# Patient Record
Sex: FEMALE | Race: BLACK OR AFRICAN AMERICAN | NOT HISPANIC OR LATINO | URBAN - METROPOLITAN AREA
[De-identification: names, ages, dates, MRNs, and addresses within clinical notes are randomized per-mention and may not be internally consistent; named-entity substitution may affect disease eponyms.]

---

## 2017-03-03 ENCOUNTER — EMERGENCY (EMERGENCY)
Facility: HOSPITAL | Age: 27
LOS: 1 days | Discharge: HOME | End: 2017-03-03
Attending: EMERGENCY MEDICINE | Admitting: EMERGENCY MEDICINE
Payer: MEDICAID

## 2017-03-03 VITALS
DIASTOLIC BLOOD PRESSURE: 84 MMHG | SYSTOLIC BLOOD PRESSURE: 120 MMHG | HEART RATE: 91 BPM | OXYGEN SATURATION: 99 % | TEMPERATURE: 98 F | RESPIRATION RATE: 20 BRPM

## 2017-03-03 VITALS
OXYGEN SATURATION: 99 % | DIASTOLIC BLOOD PRESSURE: 72 MMHG | HEART RATE: 74 BPM | RESPIRATION RATE: 20 BRPM | SYSTOLIC BLOOD PRESSURE: 108 MMHG

## 2017-03-03 DIAGNOSIS — Z90.49 ACQUIRED ABSENCE OF OTHER SPECIFIED PARTS OF DIGESTIVE TRACT: Chronic | ICD-10-CM

## 2017-03-03 DIAGNOSIS — Z90.89 ACQUIRED ABSENCE OF OTHER ORGANS: ICD-10-CM

## 2017-03-03 DIAGNOSIS — R51 HEADACHE: ICD-10-CM

## 2017-03-03 DIAGNOSIS — G43.909 MIGRAINE, UNSPECIFIED, NOT INTRACTABLE, WITHOUT STATUS MIGRAINOSUS: ICD-10-CM

## 2017-03-03 LAB
ALBUMIN SERPL ELPH-MCNC: 3.9 G/DL — SIGNIFICANT CHANGE UP (ref 3.3–5)
ALP SERPL-CCNC: 72 U/L — SIGNIFICANT CHANGE UP (ref 40–120)
ALT FLD-CCNC: 21 U/L RC — SIGNIFICANT CHANGE UP (ref 10–45)
ANION GAP SERPL CALC-SCNC: 11 MMOL/L — SIGNIFICANT CHANGE UP (ref 5–17)
APPEARANCE UR: CLEAR — SIGNIFICANT CHANGE UP
AST SERPL-CCNC: 22 U/L — SIGNIFICANT CHANGE UP (ref 10–40)
BASOPHILS # BLD AUTO: 0 K/UL — SIGNIFICANT CHANGE UP (ref 0–0.2)
BASOPHILS NFR BLD AUTO: 0.7 % — SIGNIFICANT CHANGE UP (ref 0–2)
BILIRUB SERPL-MCNC: 0.2 MG/DL — SIGNIFICANT CHANGE UP (ref 0.2–1.2)
BILIRUB UR-MCNC: NEGATIVE — SIGNIFICANT CHANGE UP
BUN SERPL-MCNC: 9 MG/DL — SIGNIFICANT CHANGE UP (ref 7–23)
CALCIUM SERPL-MCNC: 9.2 MG/DL — SIGNIFICANT CHANGE UP (ref 8.4–10.5)
CHLORIDE SERPL-SCNC: 104 MMOL/L — SIGNIFICANT CHANGE UP (ref 96–108)
CO2 SERPL-SCNC: 26 MMOL/L — SIGNIFICANT CHANGE UP (ref 22–31)
COLOR SPEC: SIGNIFICANT CHANGE UP
CREAT SERPL-MCNC: 0.86 MG/DL — SIGNIFICANT CHANGE UP (ref 0.5–1.3)
DIFF PNL FLD: NEGATIVE — SIGNIFICANT CHANGE UP
EOSINOPHIL # BLD AUTO: 0.1 K/UL — SIGNIFICANT CHANGE UP (ref 0–0.5)
EOSINOPHIL NFR BLD AUTO: 2 % — SIGNIFICANT CHANGE UP (ref 0–6)
GLUCOSE SERPL-MCNC: 168 MG/DL — HIGH (ref 70–99)
GLUCOSE UR QL: NEGATIVE — SIGNIFICANT CHANGE UP
HCT VFR BLD CALC: 33 % — LOW (ref 34.5–45)
HGB BLD-MCNC: 10.8 G/DL — LOW (ref 11.5–15.5)
KETONES UR-MCNC: NEGATIVE — SIGNIFICANT CHANGE UP
LEUKOCYTE ESTERASE UR-ACNC: NEGATIVE — SIGNIFICANT CHANGE UP
LYMPHOCYTES # BLD AUTO: 1.7 K/UL — SIGNIFICANT CHANGE UP (ref 1–3.3)
LYMPHOCYTES # BLD AUTO: 35.6 % — SIGNIFICANT CHANGE UP (ref 13–44)
MCHC RBC-ENTMCNC: 25.8 PG — LOW (ref 27–34)
MCHC RBC-ENTMCNC: 32.7 GM/DL — SIGNIFICANT CHANGE UP (ref 32–36)
MCV RBC AUTO: 78.8 FL — LOW (ref 80–100)
MONOCYTES # BLD AUTO: 0.2 K/UL — SIGNIFICANT CHANGE UP (ref 0–0.9)
MONOCYTES NFR BLD AUTO: 4.2 % — SIGNIFICANT CHANGE UP (ref 2–14)
NEUTROPHILS # BLD AUTO: 2.7 K/UL — SIGNIFICANT CHANGE UP (ref 1.8–7.4)
NEUTROPHILS NFR BLD AUTO: 57.5 % — SIGNIFICANT CHANGE UP (ref 43–77)
NITRITE UR-MCNC: NEGATIVE — SIGNIFICANT CHANGE UP
PH UR: 7 — SIGNIFICANT CHANGE UP (ref 4.8–8)
PLATELET # BLD AUTO: 312 K/UL — SIGNIFICANT CHANGE UP (ref 150–400)
POTASSIUM SERPL-MCNC: 4 MMOL/L — SIGNIFICANT CHANGE UP (ref 3.5–5.3)
POTASSIUM SERPL-SCNC: 4 MMOL/L — SIGNIFICANT CHANGE UP (ref 3.5–5.3)
PROT SERPL-MCNC: 7.2 G/DL — SIGNIFICANT CHANGE UP (ref 6–8.3)
PROT UR-MCNC: NEGATIVE — SIGNIFICANT CHANGE UP
RBC # BLD: 4.19 M/UL — SIGNIFICANT CHANGE UP (ref 3.8–5.2)
RBC # FLD: 13.3 % — SIGNIFICANT CHANGE UP (ref 10.3–14.5)
SODIUM SERPL-SCNC: 141 MMOL/L — SIGNIFICANT CHANGE UP (ref 135–145)
SP GR SPEC: 1.02 — SIGNIFICANT CHANGE UP (ref 1.01–1.02)
UROBILINOGEN FLD QL: NEGATIVE — SIGNIFICANT CHANGE UP
WBC # BLD: 4.7 K/UL — SIGNIFICANT CHANGE UP (ref 3.8–10.5)
WBC # FLD AUTO: 4.7 K/UL — SIGNIFICANT CHANGE UP (ref 3.8–10.5)

## 2017-03-03 PROCEDURE — 99284 EMERGENCY DEPT VISIT MOD MDM: CPT | Mod: 25

## 2017-03-03 PROCEDURE — 93005 ELECTROCARDIOGRAM TRACING: CPT

## 2017-03-03 PROCEDURE — 70450 CT HEAD/BRAIN W/O DYE: CPT | Mod: 26

## 2017-03-03 PROCEDURE — 99285 EMERGENCY DEPT VISIT HI MDM: CPT

## 2017-03-03 PROCEDURE — 80053 COMPREHEN METABOLIC PANEL: CPT

## 2017-03-03 PROCEDURE — 70450 CT HEAD/BRAIN W/O DYE: CPT

## 2017-03-03 PROCEDURE — 85027 COMPLETE CBC AUTOMATED: CPT

## 2017-03-03 PROCEDURE — 81003 URINALYSIS AUTO W/O SCOPE: CPT

## 2017-03-03 PROCEDURE — 96375 TX/PRO/DX INJ NEW DRUG ADDON: CPT

## 2017-03-03 PROCEDURE — 96374 THER/PROPH/DIAG INJ IV PUSH: CPT

## 2017-03-03 RX ORDER — BUTABARBITAL SODIUM 30 MG/5 ML
0 ELIXIR ORAL
Qty: 0 | Refills: 0 | COMMUNITY

## 2017-03-03 RX ORDER — METOCLOPRAMIDE HCL 10 MG
10 TABLET ORAL ONCE
Qty: 0 | Refills: 0 | Status: COMPLETED | OUTPATIENT
Start: 2017-03-03 | End: 2017-03-03

## 2017-03-03 RX ORDER — TRAMADOL HYDROCHLORIDE 50 MG/1
1 TABLET ORAL
Qty: 12 | Refills: 0 | OUTPATIENT
Start: 2017-03-03 | End: 2017-03-06

## 2017-03-03 RX ORDER — DIPHENHYDRAMINE HCL 50 MG
25 CAPSULE ORAL EVERY 4 HOURS
Qty: 0 | Refills: 0 | Status: DISCONTINUED | OUTPATIENT
Start: 2017-03-03 | End: 2017-03-07

## 2017-03-03 RX ORDER — KETOROLAC TROMETHAMINE 30 MG/ML
15 SYRINGE (ML) INJECTION ONCE
Qty: 0 | Refills: 0 | Status: DISCONTINUED | OUTPATIENT
Start: 2017-03-03 | End: 2017-03-03

## 2017-03-03 RX ADMIN — Medication 15 MILLIGRAM(S): at 12:01

## 2017-03-03 RX ADMIN — Medication 10 MILLIGRAM(S): at 12:07

## 2017-03-03 RX ADMIN — Medication 25 MILLIGRAM(S): at 12:04

## 2017-03-03 NOTE — ED PROVIDER NOTE - CARE PLAN
Principal Discharge DX:	Migraine Principal Discharge DX:	Migraine  Instructions for follow-up, activity and diet:	1. Rest, stay well hydrated. Take Motrin 600mg every 8 hours for pain as needed or tylenol 650mg every 6-8 hours for pain as needed. Take with food.  2. Follow up with neurology clinic in the next 2-3 days. Information provided.   3. Follow up with your PCP this week.   4. Return to ER for worsening pain, vomiting, fever, persistent blurred/double vision, or any other concerning symptoms. Principal Discharge DX:	Migraine  Instructions for follow-up, activity and diet:	1. Rest, stay well hydrated. Take Motrin 600mg every 8 hours for pain as needed or tylenol 650mg every 6-8 hours for pain as needed. Take with food. Take tramadol 50mg every 6 hours for severe pain. Caution drowsiness do not drive.   2. Follow up with neurology clinic in the next 2-3 days. Information provided.   3. Follow up with your PCP this week.   4. Return to ER for worsening pain, vomiting, fever, persistent blurred/double vision, or any other concerning symptoms.

## 2017-03-03 NOTE — ED ADULT NURSE REASSESSMENT NOTE - NS ED NURSE REASSESS COMMENT FT1
Pt reports that her H/A has decreased to 2/10 on pain scale. Pt is resting comfortably. Will continue to monitor pt.

## 2017-03-03 NOTE — ED PROVIDER NOTE - MEDICAL DECISION MAKING DETAILS
36yo black Female with long h/o migraine on butalbital plus ASA. Seen neurologist twice but because it was expensive she never went for workup. Pt just got medicaid and was planning to go and see physician. 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 and was not sure where she was going and forgot phone number of . She said she woke up today with HA like she always has, mostly frontal with some blurred vision. No difficulty talking. No weakness or numbness anywhere. No CP, no SOB. Looks fine now, no complaints. Non-focal neuro exam. Will obtain basic labs, orthostatics, and CT head, and f/u with neurology clinic. ZR

## 2017-03-03 NOTE — ED PROVIDER NOTE - PLAN OF CARE
1. Rest, stay well hydrated. Take Motrin 600mg every 8 hours for pain as needed or tylenol 650mg every 6-8 hours for pain as needed. Take with food.  2. Follow up with neurology clinic in the next 2-3 days. Information provided.   3. Follow up with your PCP this week.   4. Return to ER for worsening pain, vomiting, fever, persistent blurred/double vision, or any other concerning symptoms. 1. Rest, stay well hydrated. Take Motrin 600mg every 8 hours for pain as needed or tylenol 650mg every 6-8 hours for pain as needed. Take with food. Take tramadol 50mg every 6 hours for severe pain. Caution drowsiness do not drive.   2. Follow up with neurology clinic in the next 2-3 days. Information provided.   3. Follow up with your PCP this week.   4. Return to ER for worsening pain, vomiting, fever, persistent blurred/double vision, or any other concerning symptoms.

## 2017-03-03 NOTE — ED ADULT NURSE NOTE - OBJECTIVE STATEMENT
26 y/o F, reported to ED from road. A&Ox3, c/o H/A and possible syncope. Pt reports that she had a H/A that started this morning. Pt reports that she had a 10 second episode in her car where she "thinks that she lost consciousness." Pt reports that this H/A is located in the front of her head and is 10/10 on pain scale. Pt reports that this morning she noticed a "knot above her right eye." Pt reports some SOB right after the 10 second episode while driving. Pt reports that she has H/A everyday and that she doesn't have migraines. Pt reports that she has blurry vision which is normal when she has a H/A. Pt reports that she took 2 Advil today for her H/A. Pt reports some nausea without vomiting. Pt denies trauma or injury. Pt denies C/P, V/D, abd pain. Pt reports that her PMD prescribed her Butalbital to take when she has her H/A. Pt denies fever or chills or recent sick contact. Will continue to monitor pt.

## 2017-03-03 NOTE — ED PROVIDER NOTE - CONSTITUTIONAL, MLM
normal... Obese female awake, alert, oriented to person, place, time/situation and in no apparent distress.

## 2017-03-03 NOTE — ED PROVIDER NOTE - OBJECTIVE STATEMENT
37F with h/o HAs for 3 months p/w episode of syncope today. Pt reports she woke up with frontal HA this AM, 10/10 and a "knot" in her R temple. + R blurry vision that she always has with daily HAs. Took 2 advil. Pt was driving to work this AM when she either "fell asleep or passed out" for 10 seconds. Had to pull over on exit on highway. Associated nausea, light-headedness.  picked her up and drove her to ED.  Takes butalbital-ASA-caffeine daily. Denies fever/chills, CP, palpitations, numbness/tingling, recent illness, recent travel, smoking, OCP use. 37F with h/o HAs for 3 months p/w episode of syncope today. Pt reports she woke up with frontal HA this AM, 10/10 and a "knot" in her R temple. + R blurry vision that she always has with daily HAs. Took 2 Advil. Pt was driving to work this AM when she either "fell asleep or passed out" for 10 seconds. Had to pull over on exit on highway. Associated nausea, light-headedness.  picked her up and drove her to ED.  Takes butalbital-ASA-caffeine daily. Denies fever/chills, CP, palpitations, numbness/tingling, recent illness, recent travel, smoking, OCP use.

## 2017-03-08 PROBLEM — Z00.00 ENCOUNTER FOR PREVENTIVE HEALTH EXAMINATION: Status: ACTIVE | Noted: 2017-03-08

## 2017-05-04 ENCOUNTER — APPOINTMENT (OUTPATIENT)
Dept: NEUROLOGY | Facility: HOSPITAL | Age: 27
End: 2017-05-04

## 2019-12-12 ENCOUNTER — RESULT REVIEW (OUTPATIENT)
Age: 29
End: 2019-12-12